# Patient Record
Sex: MALE | Race: WHITE | NOT HISPANIC OR LATINO | Employment: FULL TIME | URBAN - METROPOLITAN AREA
[De-identification: names, ages, dates, MRNs, and addresses within clinical notes are randomized per-mention and may not be internally consistent; named-entity substitution may affect disease eponyms.]

---

## 2017-01-17 ENCOUNTER — PATIENT MESSAGE (OUTPATIENT)
Dept: HEPATOLOGY | Facility: CLINIC | Age: 37
End: 2017-01-17

## 2017-01-17 ENCOUNTER — TELEPHONE (OUTPATIENT)
Dept: HEPATOLOGY | Facility: CLINIC | Age: 37
End: 2017-01-17

## 2017-01-17 DIAGNOSIS — I81 PORTAL VEIN THROMBOSIS: Primary | ICD-10-CM

## 2017-01-17 NOTE — TELEPHONE ENCOUNTER
MA spoke with patient, appt schedule 01/23/2017 @ 4pm. Pt will arrive at 230 to get contrast.   NPO 4 hour before appt. Pt verbalized understanding. Appt letter mailed,.

## 2017-01-17 NOTE — TELEPHONE ENCOUNTER
----- Message from Karen Dixon sent at 1/17/2017  1:42 PM CST -----  Contact: self  Patient states need to speak with nurse regarding appointment for Ct Scan   Please call pt at [phone].

## 2017-01-23 ENCOUNTER — HOSPITAL ENCOUNTER (OUTPATIENT)
Dept: RADIOLOGY | Facility: HOSPITAL | Age: 37
Discharge: HOME | End: 2017-01-23
Attending: INTERNAL MEDICINE
Payer: COMMERCIAL

## 2017-01-23 ENCOUNTER — OFFICE VISIT (OUTPATIENT)
Dept: HEPATOLOGY | Facility: CLINIC | Age: 37
End: 2017-01-23
Payer: COMMERCIAL

## 2017-01-23 VITALS
SYSTOLIC BLOOD PRESSURE: 126 MMHG | DIASTOLIC BLOOD PRESSURE: 80 MMHG | WEIGHT: 226 LBS | RESPIRATION RATE: 18 BRPM | TEMPERATURE: 97 F | BODY MASS INDEX: 36.32 KG/M2 | HEART RATE: 52 BPM | HEIGHT: 66 IN | OXYGEN SATURATION: 98 %

## 2017-01-23 DIAGNOSIS — K76.6 PORTAL HYPERTENSION WITH ESOPHAGEAL VARICES: Primary | ICD-10-CM

## 2017-01-23 DIAGNOSIS — I26.01 ACUTE SEPTIC PULMONARY EMBOLISM WITH ACUTE COR PULMONALE: ICD-10-CM

## 2017-01-23 DIAGNOSIS — I81 PORTAL VEIN THROMBOSIS: ICD-10-CM

## 2017-01-23 DIAGNOSIS — I85.00 PORTAL HYPERTENSION WITH ESOPHAGEAL VARICES: Primary | ICD-10-CM

## 2017-01-23 PROCEDURE — 25500020 PHARM REV CODE 255: Performed by: INTERNAL MEDICINE

## 2017-01-23 PROCEDURE — 99214 OFFICE O/P EST MOD 30 MIN: CPT | Mod: S$GLB,,, | Performed by: INTERNAL MEDICINE

## 2017-01-23 PROCEDURE — 99999 PR PBB SHADOW E&M-EST. PATIENT-LVL III: CPT | Mod: PBBFAC,,, | Performed by: INTERNAL MEDICINE

## 2017-01-23 PROCEDURE — 74160 CT ABDOMEN W/CONTRAST: CPT | Mod: 26,,, | Performed by: RADIOLOGY

## 2017-01-23 PROCEDURE — 1159F MED LIST DOCD IN RCRD: CPT | Mod: S$GLB,,, | Performed by: INTERNAL MEDICINE

## 2017-01-23 PROCEDURE — 74160 CT ABDOMEN W/CONTRAST: CPT | Mod: TC

## 2017-01-23 RX ORDER — PANTOPRAZOLE SODIUM 40 MG/1
40 TABLET, DELAYED RELEASE ORAL DAILY
COMMUNITY
Start: 2017-01-03

## 2017-01-23 RX ORDER — VORTIOXETINE 10 MG/1
10 TABLET, FILM COATED ORAL DAILY
COMMUNITY
Start: 2017-01-10 | End: 2018-05-28

## 2017-01-23 RX ADMIN — IOHEXOL 15 ML: 350 INJECTION, SOLUTION INTRAVENOUS at 02:01

## 2017-01-23 RX ADMIN — IOHEXOL 100 ML: 350 INJECTION, SOLUTION INTRAVENOUS at 03:01

## 2017-01-23 NOTE — PROGRESS NOTES
Subjective:       Patient ID: Parker Peterson is a 36 y.o. male.    Chief Complaint: Follow-up    HPI  I saw this 36 y.o. in the hepatology clinic for follow up.    Mesenteric Vein thrombosis in 2007- anticoagulated with warfarin for about 18 months    Admitted with abdominal pain in Sep 2014- portal vein/splenic vein clot  Varices/mild ascites  Banded    Variceal bleed in [place] in Oct 2014- admitted to [facility]/[place]  - esophageal and gastric varices  - banded for the second time in 1 month    Now feels well    Seen by Dr Wyatt Dumont- not found to have a hematologic condition- long term anticoagulation recommended.    Dr Rodriguez - EGD and banded - May 2016 (esophageal + gastric)  CT scan Jan 2016- open veins      Liver biopsy in Nov 2014- essentially normal    Abdominal US: 10/9/15  #1. Subtle nodular contour of the liver. This may reflects cirrhosis.  #2. Splenomegaly.    FH:  No liver disease or clotting disorders    SH:  [facility]    9 year old daughter- healhy  Alcohol- never heavy  Non smoker  No drugs    No supplements    Review of Systems   Constitutional: Negative for activity change, appetite change, chills, fatigue, fever and unexpected weight change.   HENT: Negative for hearing loss.    Eyes: Negative for discharge and visual disturbance.   Respiratory: Negative for cough, chest tightness, shortness of breath and wheezing.    Cardiovascular: Negative for chest pain, palpitations and leg swelling.   Gastrointestinal: Negative for abdominal distention, abdominal pain, constipation, diarrhea and nausea.   Genitourinary: Negative for dysuria and frequency.   Musculoskeletal: Negative for arthralgias and back pain.   Skin: Negative for pallor and rash.   Neurological: Negative for dizziness, tremors, speech difficulty and headaches.   Hematological: Negative for adenopathy.   Psychiatric/Behavioral: Negative for agitation and confusion.           Lab Results   Component Value  Date    ALT 58 (H) 05/30/2016     Past Medical History   Diagnosis Date    Anemia     Esophageal varices     Mesenteric vein thrombosis     Portal hypertension with esophageal varices     Pulmonary embolism      Past Surgical History   Procedure Laterality Date    Cholecystectomy       Laparoscopic    Tonsillectomy      Esophagogastroduodenoscopy w/ banding       Current Outpatient Prescriptions   Medication Sig    pantoprazole (PROTONIX) 40 MG tablet Take 40 mg by mouth Daily.    propranolol (INDERAL) 10 MG tablet Take 10 mg by mouth 3 (three) times daily.    TRINTELLIX 10 mg Tab Take 10 mg by mouth Daily.    warfarin (COUMADIN) 5 MG tablet Take 1 tablet (5 mg total) by mouth Daily. (Patient taking differently: Take 5 mg by mouth Daily. )     No current facility-administered medications for this visit.        Objective:      Physical Exam   Constitutional: He is oriented to person, place, and time. He appears well-nourished.   HENT:   Head: Normocephalic.   Eyes: Pupils are equal, round, and reactive to light.   Neck: No thyromegaly present.   Cardiovascular: Normal rate, regular rhythm and normal heart sounds.    Pulmonary/Chest: Effort normal and breath sounds normal. He has no wheezes.   Abdominal: Soft. He exhibits no distension and no mass. There is no tenderness.   Lymphadenopathy:     He has no cervical adenopathy.   Neurological: He is alert and oriented to person, place, and time.   Skin: Skin is warm. No rash noted. No erythema.   Psychiatric: He has a normal mood and affect. His behavior is normal.         Assessment:       1. Portal hypertension with esophageal varices    2. Acute septic pulmonary embolism with acute cor pulmonale        Plan:   Continue anticoagulation (warfarin) which will likely be lifelong.    - CT abdo today- to assess patency of portal veins  - EGD locally to consider further banding    - CT today  - EGD  - continue with warfarin  1 year. Video visit    ? Endocrinology  referral for estrogen level as a cause of hypercoagulopathy?  Will discuss with PCP.

## 2017-04-18 ENCOUNTER — PATIENT MESSAGE (OUTPATIENT)
Dept: GASTROENTEROLOGY | Facility: CLINIC | Age: 37
End: 2017-04-18

## 2017-04-18 DIAGNOSIS — I85.10 ESOPHAGEAL VARICES IN CIRRHOSIS: Primary | ICD-10-CM

## 2017-04-18 DIAGNOSIS — K74.60 ESOPHAGEAL VARICES IN CIRRHOSIS: Primary | ICD-10-CM

## 2017-04-20 ENCOUNTER — TELEPHONE (OUTPATIENT)
Dept: ENDOSCOPY | Facility: HOSPITAL | Age: 37
End: 2017-04-20

## 2017-04-20 NOTE — TELEPHONE ENCOUNTER
Fax sent to Dr. Walker at [facility], p: [phone] [phone], requesting permission to hold Coumadin x 5 days prior to procedure. Awaiting fax back.

## 2017-04-27 ENCOUNTER — TELEPHONE (OUTPATIENT)
Dept: ENDOSCOPY | Facility: HOSPITAL | Age: 37
End: 2017-04-27

## 2017-04-27 DIAGNOSIS — I85.00 ESOPHAGEAL VARICES WITHOUT BLEEDING, UNSPECIFIED ESOPHAGEAL VARICES TYPE: Primary | ICD-10-CM

## 2017-04-27 NOTE — TELEPHONE ENCOUNTER
Patient is scheduled for EGD 5/26/2017 with Dr. Huntley.  Instructions sent via e-mail. Patient may hold Coumadin x 5 days prior to procedure, as per Dr. Walker.  Labs prior.

## 2017-05-26 ENCOUNTER — SURGERY (OUTPATIENT)
Age: 37
End: 2017-05-26

## 2017-05-26 ENCOUNTER — ANESTHESIA EVENT (OUTPATIENT)
Dept: ENDOSCOPY | Facility: HOSPITAL | Age: 37
End: 2017-05-26
Payer: COMMERCIAL

## 2017-05-26 ENCOUNTER — ANESTHESIA (OUTPATIENT)
Dept: ENDOSCOPY | Facility: HOSPITAL | Age: 37
End: 2017-05-26
Payer: COMMERCIAL

## 2017-05-26 ENCOUNTER — HOSPITAL ENCOUNTER (OUTPATIENT)
Facility: HOSPITAL | Age: 37
Discharge: HOME | End: 2017-05-26
Attending: INTERNAL MEDICINE | Admitting: INTERNAL MEDICINE
Payer: COMMERCIAL

## 2017-05-26 VITALS
SYSTOLIC BLOOD PRESSURE: 147 MMHG | RESPIRATION RATE: 18 BRPM | HEART RATE: 63 BPM | DIASTOLIC BLOOD PRESSURE: 87 MMHG | TEMPERATURE: 98 F | WEIGHT: 220 LBS | OXYGEN SATURATION: 98 % | HEIGHT: 66 IN | BODY MASS INDEX: 35.36 KG/M2

## 2017-05-26 DIAGNOSIS — I26.01 ACUTE SEPTIC PULMONARY EMBOLISM WITH ACUTE COR PULMONALE: Primary | ICD-10-CM

## 2017-05-26 DIAGNOSIS — I85.00 PORTAL HYPERTENSION WITH ESOPHAGEAL VARICES: ICD-10-CM

## 2017-05-26 DIAGNOSIS — K76.6 PORTAL HYPERTENSION: ICD-10-CM

## 2017-05-26 DIAGNOSIS — K76.6 PORTAL HYPERTENSION WITH ESOPHAGEAL VARICES: ICD-10-CM

## 2017-05-26 PROCEDURE — 25000003 PHARM REV CODE 250: Performed by: INTERNAL MEDICINE

## 2017-05-26 PROCEDURE — 37000008 HC ANESTHESIA 1ST 15 MINUTES: Performed by: INTERNAL MEDICINE

## 2017-05-26 PROCEDURE — 25000003 PHARM REV CODE 250: Performed by: NURSE ANESTHETIST, CERTIFIED REGISTERED

## 2017-05-26 PROCEDURE — 43235 EGD DIAGNOSTIC BRUSH WASH: CPT | Performed by: INTERNAL MEDICINE

## 2017-05-26 PROCEDURE — 63600175 PHARM REV CODE 636 W HCPCS: Performed by: NURSE ANESTHETIST, CERTIFIED REGISTERED

## 2017-05-26 PROCEDURE — 43235 EGD DIAGNOSTIC BRUSH WASH: CPT | Mod: ,,, | Performed by: INTERNAL MEDICINE

## 2017-05-26 PROCEDURE — 37000009 HC ANESTHESIA EA ADD 15 MINS: Performed by: INTERNAL MEDICINE

## 2017-05-26 PROCEDURE — D9220A PRA ANESTHESIA: Mod: CRNA,,, | Performed by: NURSE ANESTHETIST, CERTIFIED REGISTERED

## 2017-05-26 PROCEDURE — D9220A PRA ANESTHESIA: Mod: ANES,,, | Performed by: ANESTHESIOLOGY

## 2017-05-26 RX ORDER — SODIUM CHLORIDE 9 MG/ML
INJECTION, SOLUTION INTRAVENOUS CONTINUOUS
Status: DISCONTINUED | OUTPATIENT
Start: 2017-05-26 | End: 2017-05-26 | Stop reason: HOSPADM

## 2017-05-26 RX ORDER — LIDOCAINE HCL/PF 100 MG/5ML
SYRINGE (ML) INTRAVENOUS
Status: DISCONTINUED | OUTPATIENT
Start: 2017-05-26 | End: 2017-05-26

## 2017-05-26 RX ORDER — PROPOFOL 10 MG/ML
VIAL (ML) INTRAVENOUS
Status: DISCONTINUED | OUTPATIENT
Start: 2017-05-26 | End: 2017-05-26

## 2017-05-26 RX ADMIN — PROPOFOL 50 MG: 10 INJECTION, EMULSION INTRAVENOUS at 01:05

## 2017-05-26 RX ADMIN — LIDOCAINE HYDROCHLORIDE 20 MG: 20 INJECTION, SOLUTION INTRAVENOUS at 01:05

## 2017-05-26 RX ADMIN — PROPOFOL 100 MG: 10 INJECTION, EMULSION INTRAVENOUS at 01:05

## 2017-05-26 RX ADMIN — SODIUM CHLORIDE: 0.9 INJECTION, SOLUTION INTRAVENOUS at 12:05

## 2017-05-26 NOTE — DISCHARGE INSTRUCTIONS

## 2017-05-26 NOTE — ANESTHESIA POSTPROCEDURE EVALUATION
"Anesthesia Post Evaluation    Patient: Parker Peterson    Procedure(s) Performed: Procedure(s) (LRB):  ESOPHAGOGASTRODUODENOSCOPY (EGD) (N/A)    Final Anesthesia Type: general  Patient location during evaluation: GI PACU  Patient participation: Yes- Able to Participate  Level of consciousness: awake and alert  Post-procedure vital signs: reviewed and stable  Pain management: adequate  Airway patency: patent  PONV status at discharge: No PONV  Anesthetic complications: no      Cardiovascular status: blood pressure returned to baseline  Respiratory status: unassisted  Hydration status: euvolemic  Follow-up not needed.        Visit Vitals  BP (!) 147/87 (BP Location: Left arm, Patient Position: Sitting, BP Method: Automatic)   Pulse 63   Temp 36.7 °C (98.1 °F) (Oral)   Resp 18   Ht 5' 6" (1.676 m)   Wt 99.8 kg (220 lb)   SpO2 98%   BMI 35.51 kg/m²       Pain/Kori Score: Pain Assessment Performed: Yes (5/26/2017  1:44 PM)  Presence of Pain: denies (5/26/2017  1:44 PM)  Kori Score: 10 (5/26/2017  1:29 PM)      "

## 2017-05-26 NOTE — TRANSFER OF CARE
"Anesthesia Transfer of Care Note    Patient: Parker Peterson    Procedure(s) Performed: Procedure(s) (LRB):  ESOPHAGOGASTRODUODENOSCOPY (EGD) (N/A)    Patient location: PACU    Anesthesia Type: general    Transport from OR: Transported from OR on 6-10 L/min O2 by face mask with adequate spontaneous ventilation    Post pain: adequate analgesia    Post assessment: no apparent anesthetic complications    Post vital signs: stable    Level of consciousness: awake    Nausea/Vomiting: no nausea/vomiting    Complications: none    Transfer of care protocol was followed      Last vitals:   Visit Vitals  /64 (BP Location: Right arm, Patient Position: Lying, BP Method: Automatic)   Pulse 81   Temp 36.7 °C (98.1 °F) (Oral)   Resp 16   Ht 5' 6" (1.676 m)   Wt 99.8 kg (220 lb)   SpO2 99%   BMI 35.51 kg/m²     "

## 2017-05-26 NOTE — H&P
Short Stay Endoscopy History and Physical    PCP - Primary Doctor No     Procedure - EGD  ASA - per anesthesia  Mallampati - per anesthesia  History of Anesthesia problems - no  Family history Anesthesia problems -  no   Plan of anesthesia - General    HPI:  This is a 36 y.o. male here for evaluation of :     Esophageal varices    ROS:  Constitutional: No fevers, chills, No weight loss  CV: No chest pain  Pulm: No cough, No shortness of breath  Ophtho: No vision changes  GI: see HPI  Derm: No rash    Medical History:  has a past medical history of Anemia; Esophageal varices; Mesenteric vein thrombosis; Portal hypertension with esophageal varices; and Pulmonary embolism.    Surgical History:  has a past surgical history that includes Cholecystectomy; Tonsillectomy; and Esophagogastroduodenoscopy w/ banding.    Family History: family history is not on file.. Otherwise no colon cancer, inflammatory bowel disease, or GI malignancies.    Social History:  reports that he has never smoked. He does not have any smokeless tobacco history on file. He reports that he drinks alcohol. He reports that he does not use drugs.    Review of patient's allergies indicates:  No Known Allergies    Medications:   Prescriptions Prior to Admission   Medication Sig Dispense Refill Last Dose    pantoprazole (PROTONIX) 40 MG tablet Take 40 mg by mouth Daily.   5/25/2017 at 2030    propranolol (INDERAL) 10 MG tablet Take 10 mg by mouth 3 (three) times daily.   5/25/2017 at 2030    TRINTELLIX 10 mg Tab Take 10 mg by mouth Daily.   5/26/2017 at 0615    warfarin (COUMADIN) 5 MG tablet Take 1 tablet (5 mg total) by mouth Daily. (Patient taking differently: Take 5 mg by mouth Daily. ) 30 tablet 3 5/22/2017 at 2130       Physical Exam:    Vital Signs:   Vitals:    05/26/17 1213   BP: 124/82   Pulse: 64   Resp: 20   Temp: 98.8 °F (37.1 °C)       General Appearance: Well appearing in no acute distress  Eyes:    No scleral icterus  ENT: Neck  supple, Lips, mucosa, and tongue normal; teeth and gums normal  Lungs: CTA anteriorly  Heart:  Regular rate, S1, S2 normal, no murmurs heard.  Abdomen: Soft, non tender, non distended with normal bowel sounds. No hepatosplenomegaly, ascites, or mass.  Extremities: No edema  Skin: No rash    Labs:  Lab Results   Component Value Date    WBC 5.47 05/26/2017    HGB 15.2 05/26/2017    HCT 44.4 05/26/2017     (L) 05/26/2017    ALT 58 (H) 05/30/2016    AST 19 05/30/2016     05/30/2016    K 4.4 05/30/2016     05/30/2016    CREATININE 0.9 05/30/2016    BUN 16 05/30/2016    CO2 25 05/30/2016    INR 1.2 05/26/2017       I have explained the risks and benefits of endoscopy procedures to the patient including but not limited to bleeding, perforation, infection, and death.      Cortez Huntley MD

## 2017-05-26 NOTE — PATIENT INSTRUCTIONS
Discharge Summary/Instructions for after EGD without Biopsy  Patient Name: Parker Peterson  Patient MRN: [id number]  Patient YOB: 1980  Friday, May 26, 2017  Cortez Huntley MD  1.  Do Not eat or drink anything for 1 hour.  Try sips of water first.  If   tolerated, resume your regular diet or one recommended by your physician.  2.  Do not drive, operate machinery, make critical decisions, or do   activities that require coordination or balance for 24 hours.  3.  You may experience a sore throat for 24 to 48 hours.  You may use throat   lozenges or gargle with warm salt water to relieve the discomfort.  4.  Because air was put into your stomach during the procedure, you may   experience some belching.  5.  Go directly to the emergency room if you notice any of the following:   Chills and/or fever over 101   Persistent vomiting or vomiting with blood   Severe abdominal pain, other than gas cramps   Severe chest pain   Black, tarry stools  Your doctor recommends these additional instructions:  If any biopsies were performed, my office will call you in 5 to 6 business   days with any results.  You have a contact number available for emergencies.  The signs and symptoms   of potential delayed complications were discussed with you.  You may return   to normal activities tomorrow.  Written discharge instructions were   provided to you.   You are being discharged to home.   Your physician has recommended a repeat upper endoscopy in one year for   surveillance.   The findings and recommendations were discussed with your designated   responsible adult.  If you have any questions or problems, please call your physician.  EMERGENCY PHONE NUMBER: [phone]  LAB RESULTS: [phone]  Cortez Huntley MD  5/26/2017 1:10:08 PM  This report has been verified and signed electronically.

## 2017-05-26 NOTE — ANESTHESIA PREPROCEDURE EVALUATION
05/26/2017  Parker Peterson is a 36 y.o., male.    Anesthesia Evaluation    I have reviewed the Patient Summary Reports.    I have reviewed the Nursing Notes.   I have reviewed the Medications.     Review of Systems  Anesthesia Hx:  No problems with previous Anesthesia  History of prior surgery of interest to airway management or planning: Previous anesthesia: General Denies Family Hx of Anesthesia complications.   Denies Personal Hx of Anesthesia complications.   Social:  Non-Smoker    Hematology/Oncology:     Oncology Normal    -- Anemia:   EENT/Dental:EENT/Dental Normal   Cardiovascular:  Cardiovascular Normal Exercise tolerance: good  Hx mesenteric vein thrombosis; PE; portal HTN   Pulmonary:  Pulmonary Normal    Renal/:  Renal/ Normal     Hepatic/GI:   GERD Esophageal varices   Musculoskeletal:  Musculoskeletal Normal    Neurological:  Neurology Normal    Endocrine:  Endocrine Normal    Dermatological:  Skin Normal    Psych:  Psychiatric Normal           Physical Exam  General:  Well nourished, Obesity    Airway/Jaw/Neck:  Airway Findings: Mouth Opening: Small, but > 3cm Tongue: Normal  General Airway Assessment: Adult, Average  Mallampati: III  Improves to II with phonation.  TM Distance: 4 - 6 cm        Eyes/Ears/Nose:  EYES/EARS/NOSE FINDINGS: Normal   Dental:  Dental Findings: In tact   Chest/Lungs:  Chest/Lungs Findings: Clear to auscultation, Normal Respiratory Rate     Heart/Vascular:  Heart Findings: Rate: Normal  Rhythm: Regular Rhythm  Sounds: Normal  Heart murmur: negative Vascular Findings: Normal    Abdomen:  Abdomen Findings: Normal    Musculoskeletal:  Musculoskeletal Findings: Normal   Skin:  Skin Findings: Normal    Mental Status:  Mental Status Findings:  Cooperative, Alert and Oriented         Anesthesia Plan  Type of Anesthesia, risks & benefits discussed:  Anesthesia Type:   general  Patient's Preference:   Intra-op Monitoring Plan:   Intra-op Monitoring Plan Comments:   Post Op Pain Control Plan:   Post Op Pain Control Plan Comments:   Induction:   IV  Beta Blocker:  Patient is on a Beta-Blocker and has received one dose within the past 24 hours (No further documentation required).       Informed Consent: Patient understands risks and agrees with Anesthesia plan.  Questions answered. Anesthesia consent signed with patient.  ASA Score: 3     Day of Surgery Review of History & Physical:  There are no significant changes.          Ready For Surgery From Anesthesia Perspective.

## 2017-06-02 ENCOUNTER — TELEPHONE (OUTPATIENT)
Dept: ENDOSCOPY | Facility: HOSPITAL | Age: 37
End: 2017-06-02

## 2018-03-24 ENCOUNTER — PATIENT MESSAGE (OUTPATIENT)
Dept: HEPATOLOGY | Facility: CLINIC | Age: 38
End: 2018-03-24

## 2018-03-26 DIAGNOSIS — I85.00 PORTAL HYPERTENSION WITH ESOPHAGEAL VARICES: ICD-10-CM

## 2018-03-26 DIAGNOSIS — K76.6 PORTAL HYPERTENSION WITH ESOPHAGEAL VARICES: ICD-10-CM

## 2018-03-26 DIAGNOSIS — K74.60 CIRRHOSIS OF LIVER WITHOUT ASCITES, UNSPECIFIED HEPATIC CIRRHOSIS TYPE: Primary | ICD-10-CM

## 2018-04-12 ENCOUNTER — PATIENT MESSAGE (OUTPATIENT)
Dept: HEPATOLOGY | Facility: CLINIC | Age: 38
End: 2018-04-12

## 2018-05-28 ENCOUNTER — OFFICE VISIT (OUTPATIENT)
Dept: HEPATOLOGY | Facility: CLINIC | Age: 38
End: 2018-05-28
Payer: COMMERCIAL

## 2018-05-28 ENCOUNTER — HOSPITAL ENCOUNTER (OUTPATIENT)
Dept: RADIOLOGY | Facility: HOSPITAL | Age: 38
Discharge: HOME | End: 2018-05-28
Attending: INTERNAL MEDICINE
Payer: COMMERCIAL

## 2018-05-28 VITALS
OXYGEN SATURATION: 98 % | SYSTOLIC BLOOD PRESSURE: 130 MMHG | DIASTOLIC BLOOD PRESSURE: 86 MMHG | WEIGHT: 226.19 LBS | RESPIRATION RATE: 18 BRPM | BODY MASS INDEX: 36.35 KG/M2 | HEART RATE: 67 BPM | TEMPERATURE: 96 F | HEIGHT: 66 IN

## 2018-05-28 DIAGNOSIS — K76.6 PORTAL HYPERTENSION WITH ESOPHAGEAL VARICES: ICD-10-CM

## 2018-05-28 DIAGNOSIS — D68.59 HYPERCOAGULABLE STATE, PRIMARY: ICD-10-CM

## 2018-05-28 DIAGNOSIS — I26.01 ACUTE SEPTIC PULMONARY EMBOLISM WITH ACUTE COR PULMONALE: ICD-10-CM

## 2018-05-28 DIAGNOSIS — K76.6 PORTAL HYPERTENSION: Primary | ICD-10-CM

## 2018-05-28 DIAGNOSIS — I85.00 PORTAL HYPERTENSION WITH ESOPHAGEAL VARICES: ICD-10-CM

## 2018-05-28 PROCEDURE — 3008F BODY MASS INDEX DOCD: CPT | Mod: CPTII,S$GLB,, | Performed by: INTERNAL MEDICINE

## 2018-05-28 PROCEDURE — 74160 CT ABDOMEN W/CONTRAST: CPT | Mod: 26,,, | Performed by: RADIOLOGY

## 2018-05-28 PROCEDURE — 99215 OFFICE O/P EST HI 40 MIN: CPT | Mod: S$GLB,,, | Performed by: INTERNAL MEDICINE

## 2018-05-28 PROCEDURE — 74160 CT ABDOMEN W/CONTRAST: CPT | Mod: TC

## 2018-05-28 PROCEDURE — 99999 PR PBB SHADOW E&M-EST. PATIENT-LVL III: CPT | Mod: PBBFAC,,, | Performed by: INTERNAL MEDICINE

## 2018-05-28 PROCEDURE — 25500020 PHARM REV CODE 255: Performed by: INTERNAL MEDICINE

## 2018-05-28 RX ORDER — VENLAFAXINE HYDROCHLORIDE 75 MG/1
75 CAPSULE, EXTENDED RELEASE ORAL DAILY
Status: ON HOLD | COMMUNITY
End: 2022-06-24 | Stop reason: HOSPADM

## 2018-05-28 RX ADMIN — IOHEXOL 100 ML: 350 INJECTION, SOLUTION INTRAVENOUS at 09:05

## 2018-05-28 NOTE — PROGRESS NOTES
Subjective:       Patient ID: Parker Peterson is a 37 y.o. male.    Chief Complaint: Abnormal Abdominal/Liver Imaging    HPI  I saw this 37 y.o. in the hepatology clinic for follow up. He came with his wife.    Mesenteric Vein thrombosis in 2007- anticoagulated with warfarin for about 18 months    Admitted with abdominal pain in Sep 2014- portal vein/splenic vein clot  Varices/mild ascites  Banded    Variceal bleed in [place] in Oct 2014- admitted to [facility]/[place]  - esophageal and gastric varices  - banded for the second time in 1 month    Now feels well  Has gained 30 lb since 2014    Seen by Dr Wyatt Dumont- not found to have a hematologic condition- long term anticoagulation recommended.    Dr Rodriguez - EGD and banded - May 2016 (esophageal + gastric)  CT scan Jan 2016- open veins    Last EGD: May 2017  - Normal esophagus. Scarred from prior banding.                        - 3 cm hiatal hernia.                        - Normal stomach.                        - Normal examined duodenum    Liver biopsy in Nov 2014- essentially normal  LFTs normal today  Still on warfarin.    CT abdo: 5/28/2018  Gastric varices, mesenteric and splenic collateral suggestive of portal hypertension.  Small caliber splenic vein.  Atrophic right hepatic lobe, unchanged.  Additional findings include a small hiatal hernia and cholecystectomy.    FH:  No liver disease or clotting disorders    SH:  [facility]    9 year old daughter- healhy  Alcohol- never heavy  Non smoker  No drugs    No supplements    Review of Systems   Constitutional: Negative for activity change, appetite change, chills, fatigue, fever and unexpected weight change.   HENT: Negative for hearing loss.    Eyes: Negative for discharge and visual disturbance.   Respiratory: Negative for cough, chest tightness, shortness of breath and wheezing.    Cardiovascular: Negative for chest pain, palpitations and leg swelling.   Gastrointestinal: Negative for  abdominal distention, abdominal pain, constipation, diarrhea and nausea.   Genitourinary: Negative for dysuria and frequency.   Musculoskeletal: Negative for arthralgias and back pain.   Skin: Negative for pallor and rash.   Neurological: Negative for dizziness, tremors, speech difficulty and headaches.   Hematological: Negative for adenopathy.   Psychiatric/Behavioral: Negative for agitation and confusion.           Lab Results   Component Value Date    ALT 24 05/28/2018     Past Medical History:   Diagnosis Date    Anemia     Esophageal varices     Mesenteric vein thrombosis     Portal hypertension with esophageal varices     Pulmonary embolism      Past Surgical History:   Procedure Laterality Date    CHOLECYSTECTOMY      Laparoscopic    ESOPHAGOGASTRODUODENOSCOPY W/ BANDING      TONSILLECTOMY       Current Outpatient Prescriptions   Medication Sig    pantoprazole (PROTONIX) 40 MG tablet Take 40 mg by mouth Daily.    propranolol (INDERAL) 10 MG tablet Take 10 mg by mouth 3 (three) times daily.    venlafaxine (EFFEXOR-XR) 75 MG 24 hr capsule Take 75 mg by mouth once daily.    warfarin (COUMADIN) 5 MG tablet Take 1 tablet (5 mg total) by mouth Daily. (Patient taking differently: Take 5 mg by mouth Daily. )     No current facility-administered medications for this visit.        Objective:      Physical Exam   Constitutional: He is oriented to person, place, and time. He appears well-nourished.   HENT:   Head: Normocephalic.   Eyes: Pupils are equal, round, and reactive to light.   Neck: No thyromegaly present.   Cardiovascular: Normal rate, regular rhythm and normal heart sounds.    Pulmonary/Chest: Effort normal and breath sounds normal. He has no wheezes.   Abdominal: Soft. He exhibits no distension and no mass. There is no tenderness.   Lymphadenopathy:     He has no cervical adenopathy.   Neurological: He is alert and oriented to person, place, and time.   Skin: Skin is warm. No rash noted. No  erythema.   Psychiatric: He has a normal mood and affect. His behavior is normal.         Assessment:       1. Portal hypertension    2. Portal hypertension with esophageal varices    3. Acute septic pulmonary embolism with acute cor pulmonale    4. Hypercoagulable state, primary        Plan:   Continue anticoagulation (warfarin) which will likely be lifelong.    - CT abdo - will ask Dr Silva whether an intervention to open up his portal vein would be a possibility.    - will see local hematologist to discuss new anticoagulation drugs.    Clinic in 1 year.

## 2018-05-29 ENCOUNTER — PATIENT MESSAGE (OUTPATIENT)
Dept: HEPATOLOGY | Facility: CLINIC | Age: 38
End: 2018-05-29

## 2018-09-06 ENCOUNTER — TELEPHONE (OUTPATIENT)
Dept: ENDOSCOPY | Facility: HOSPITAL | Age: 38
End: 2018-09-06

## 2018-09-06 ENCOUNTER — PATIENT MESSAGE (OUTPATIENT)
Dept: GASTROENTEROLOGY | Facility: CLINIC | Age: 38
End: 2018-09-06

## 2018-09-06 DIAGNOSIS — I85.10 ESOPHAGEAL VARICES IN CIRRHOSIS: Primary | ICD-10-CM

## 2018-09-06 DIAGNOSIS — K74.60 ESOPHAGEAL VARICES IN CIRRHOSIS: Primary | ICD-10-CM

## 2018-09-06 NOTE — TELEPHONE ENCOUNTER
Dr. Huntley,  Per pt records - Repeat upper endoscopy in 1 year for surveillance  Please advise on orders once placed

## 2018-09-17 ENCOUNTER — TELEPHONE (OUTPATIENT)
Dept: ENDOSCOPY | Facility: HOSPITAL | Age: 38
End: 2018-09-17

## 2018-09-17 DIAGNOSIS — I85.00 ESOPHAGEAL VARICES WITHOUT BLEEDING, UNSPECIFIED ESOPHAGEAL VARICES TYPE: Primary | ICD-10-CM

## 2018-09-21 ENCOUNTER — PATIENT MESSAGE (OUTPATIENT)
Dept: ADMINISTRATIVE | Facility: OTHER | Age: 38
End: 2018-09-21

## 2018-10-12 ENCOUNTER — ANESTHESIA (OUTPATIENT)
Dept: ENDOSCOPY | Facility: HOSPITAL | Age: 38
End: 2018-10-12
Payer: COMMERCIAL

## 2018-10-12 ENCOUNTER — HOSPITAL ENCOUNTER (OUTPATIENT)
Facility: HOSPITAL | Age: 38
Discharge: HOME | End: 2018-10-12
Attending: INTERNAL MEDICINE | Admitting: INTERNAL MEDICINE
Payer: COMMERCIAL

## 2018-10-12 ENCOUNTER — ANESTHESIA EVENT (OUTPATIENT)
Dept: ENDOSCOPY | Facility: HOSPITAL | Age: 38
End: 2018-10-12
Payer: COMMERCIAL

## 2018-10-12 VITALS
HEIGHT: 69 IN | HEART RATE: 54 BPM | DIASTOLIC BLOOD PRESSURE: 98 MMHG | BODY MASS INDEX: 32.58 KG/M2 | TEMPERATURE: 97 F | SYSTOLIC BLOOD PRESSURE: 131 MMHG | RESPIRATION RATE: 18 BRPM | OXYGEN SATURATION: 97 % | WEIGHT: 220 LBS

## 2018-10-12 DIAGNOSIS — I85.00 ESOPHAGEAL VARICES: ICD-10-CM

## 2018-10-12 DIAGNOSIS — I85.00 PORTAL HYPERTENSION WITH ESOPHAGEAL VARICES: Primary | ICD-10-CM

## 2018-10-12 DIAGNOSIS — K76.6 PORTAL HYPERTENSION WITH ESOPHAGEAL VARICES: Primary | ICD-10-CM

## 2018-10-12 PROCEDURE — 43235 EGD DIAGNOSTIC BRUSH WASH: CPT | Performed by: INTERNAL MEDICINE

## 2018-10-12 PROCEDURE — 63600175 PHARM REV CODE 636 W HCPCS: Performed by: NURSE ANESTHETIST, CERTIFIED REGISTERED

## 2018-10-12 PROCEDURE — 37000009 HC ANESTHESIA EA ADD 15 MINS: Performed by: INTERNAL MEDICINE

## 2018-10-12 PROCEDURE — 43235 EGD DIAGNOSTIC BRUSH WASH: CPT | Mod: ,,, | Performed by: INTERNAL MEDICINE

## 2018-10-12 PROCEDURE — E9220 PRA ENDO ANESTHESIA: HCPCS | Mod: ,,, | Performed by: NURSE ANESTHETIST, CERTIFIED REGISTERED

## 2018-10-12 PROCEDURE — 25000003 PHARM REV CODE 250: Performed by: INTERNAL MEDICINE

## 2018-10-12 PROCEDURE — 37000008 HC ANESTHESIA 1ST 15 MINUTES: Performed by: INTERNAL MEDICINE

## 2018-10-12 RX ORDER — PROPOFOL 10 MG/ML
VIAL (ML) INTRAVENOUS
Status: DISCONTINUED | OUTPATIENT
Start: 2018-10-12 | End: 2018-10-12

## 2018-10-12 RX ORDER — SODIUM CHLORIDE 9 MG/ML
INJECTION, SOLUTION INTRAVENOUS CONTINUOUS
Status: DISCONTINUED | OUTPATIENT
Start: 2018-10-12 | End: 2018-10-12 | Stop reason: HOSPADM

## 2018-10-12 RX ORDER — FENTANYL CITRATE 50 UG/ML
INJECTION, SOLUTION INTRAMUSCULAR; INTRAVENOUS
Status: DISCONTINUED | OUTPATIENT
Start: 2018-10-12 | End: 2018-10-12

## 2018-10-12 RX ORDER — PROPOFOL 10 MG/ML
VIAL (ML) INTRAVENOUS CONTINUOUS PRN
Status: DISCONTINUED | OUTPATIENT
Start: 2018-10-12 | End: 2018-10-12

## 2018-10-12 RX ORDER — LIDOCAINE HCL/PF 100 MG/5ML
SYRINGE (ML) INTRAVENOUS
Status: DISCONTINUED | OUTPATIENT
Start: 2018-10-12 | End: 2018-10-12

## 2018-10-12 RX ORDER — SODIUM CHLORIDE 0.9 % (FLUSH) 0.9 %
3 SYRINGE (ML) INJECTION
Status: DISCONTINUED | OUTPATIENT
Start: 2018-10-12 | End: 2018-10-12 | Stop reason: HOSPADM

## 2018-10-12 RX ADMIN — PROPOFOL 150 MCG/KG/MIN: 10 INJECTION, EMULSION INTRAVENOUS at 03:10

## 2018-10-12 RX ADMIN — FENTANYL CITRATE 50 MCG: 50 INJECTION, SOLUTION INTRAMUSCULAR; INTRAVENOUS at 03:10

## 2018-10-12 RX ADMIN — PROPOFOL 50 MG: 10 INJECTION, EMULSION INTRAVENOUS at 03:10

## 2018-10-12 RX ADMIN — LIDOCAINE HYDROCHLORIDE 50 MG: 20 INJECTION, SOLUTION INTRAVENOUS at 03:10

## 2018-10-12 RX ADMIN — SODIUM CHLORIDE: 0.9 INJECTION, SOLUTION INTRAVENOUS at 01:10

## 2018-10-12 NOTE — ANESTHESIA PREPROCEDURE EVALUATION
10/12/2018  Parker Peterson is a 38 y.o., male.   Past Medical History:   Diagnosis Date    Anemia     Esophageal varices     Mesenteric vein thrombosis     Portal hypertension with esophageal varices     Pulmonary embolism      Past Surgical History:   Procedure Laterality Date    CHOLECYSTECTOMY      Laparoscopic    ESOPHAGOGASTRODUODENOSCOPY (EGD) N/A 5/26/2017    Performed by Cortez Huntley MD at [facility] (4TH FLR)    ESOPHAGOGASTRODUODENOSCOPY (EGD) N/A 11/10/2014    Performed by Cortez Huntley MD at [facility] (2ND FLR)    ESOPHAGOGASTRODUODENOSCOPY W/ BANDING      TONSILLECTOMY           Anesthesia Evaluation    I have reviewed the Patient Summary Reports.     I have reviewed the Medications.     Review of Systems  Anesthesia Hx:  Denies Hx of Anesthetic complications  Neg history of prior surgery. Denies Family Hx of Anesthesia complications.   Denies Personal Hx of Anesthesia complications.   Cardiovascular:   Exercise tolerance: good        Physical Exam  General:  Well nourished    Airway/Jaw/Neck:  Airway Findings: Mouth Opening: Normal Tongue: Normal  General Airway Assessment: Adult  Mallampati: II  TM Distance: Normal, at least 6 cm         Dental:  DENTAL FINDINGS: Normal   Chest/Lungs:  Chest/Lungs Clear    Heart/Vascular:  Heart Findings: Normal       Mental Status:  Mental Status Findings:  Alert and Oriented         Anesthesia Plan  Type of Anesthesia, risks & benefits discussed:  Anesthesia Type:  general  Patient's Preference:   Intra-op Monitoring Plan: standard ASA monitors  Intra-op Monitoring Plan Comments:   Post Op Pain Control Plan:   Post Op Pain Control Plan Comments:   Induction:   IV  Beta Blocker:  Patient is not currently on a Beta-Blocker (No further documentation required).       Informed Consent: Patient understands risks and agrees with Anesthesia plan.  Questions  answered. Anesthesia consent signed with patient.  ASA Score: 2     Day of Surgery Review of History & Physical:    H&P update referred to the provider.         Ready For Surgery From Anesthesia Perspective.

## 2018-10-12 NOTE — ANESTHESIA POSTPROCEDURE EVALUATION
"Anesthesia Post Evaluation    Patient: Parker Peterson    Procedure(s) Performed: Procedure(s) (LRB):  ESOPHAGOGASTRODUODENOSCOPY (EGD) (N/A)    Final Anesthesia Type: general  Patient location during evaluation: GI PACU  Patient participation: Yes- Able to Participate  Level of consciousness: awake and alert and oriented  Post-procedure vital signs: reviewed and stable  Pain management: adequate  Airway patency: patent  PONV status at discharge: No PONV  Anesthetic complications: no      Cardiovascular status: stable  Respiratory status: unassisted, spontaneous ventilation and room air  Hydration status: euvolemic  Follow-up not needed.        Visit Vitals  /88 (BP Location: Left arm, Patient Position: Lying)   Pulse 66   Temp 36.3 °C (97.3 °F) (Temporal)   Resp 16   Ht 5' 9" (1.753 m)   Wt 99.8 kg (220 lb)   SpO2 99%   BMI 32.49 kg/m²       Pain/Kori Score: Pain Assessment Performed: Yes (10/12/2018  3:19 PM)  Presence of Pain: non-verbal indicators absent (10/12/2018  3:19 PM)  Kori Score: 10 (10/12/2018  3:18 PM)        "

## 2018-10-12 NOTE — TRANSFER OF CARE
"Anesthesia Transfer of Care Note    Patient: Parker Peterson    Procedure(s) Performed: Procedure(s) (LRB):  ESOPHAGOGASTRODUODENOSCOPY (EGD) (N/A)    Patient location: GI    Anesthesia Type: general    Transport from OR: Transported from OR on 2-3 L/min O2 by NC with adequate spontaneous ventilation    Post pain: adequate analgesia    Post assessment: no apparent anesthetic complications and tolerated procedure well    Post vital signs: stable    Level of consciousness: awake, alert and oriented    Nausea/Vomiting: no nausea/vomiting    Complications: none    Transfer of care protocol was followed      Last vitals:   Visit Vitals  /88 (BP Location: Left arm, Patient Position: Lying)   Pulse 66   Temp 36.3 °C (97.3 °F) (Temporal)   Resp 16   Ht 5' 9" (1.753 m)   Wt 99.8 kg (220 lb)   SpO2 99%   BMI 32.49 kg/m²     "

## 2018-10-12 NOTE — PROVATION PATIENT INSTRUCTIONS
Discharge Summary/Instructions after an Endoscopic Procedure  Patient Name: Parker Peterson  Patient MRN: [id number]  Patient YOB: 1980 Friday, October 12, 2018  Cortez Huntley MD  RESTRICTIONS:  During your procedure today, you received medications for sedation.  These   medications may affect your judgment, balance and coordination.  Therefore,   for 24 hours, you have the following restrictions:   - DO NOT drive a car, operate machinery, make legal/financial decisions,   sign important papers or drink alcohol.    ACTIVITY:  Today: no heavy lifting, straining or running due to procedural   sedation/anesthesia.  The following day: return to full activity including work.  DIET:  Eat and drink normally unless instructed otherwise.     TREATMENT FOR COMMON SIDE EFFECTS:  - Mild abdominal pain, nausea, belching, bloating or excessive gas:  rest,   eat lightly and use a heating pad.  - Sore Throat: treat with throat lozenges and/or gargle with warm salt   water.  - Because air was used during the procedure, expelling large amounts of air   from your rectum or belching is normal.  - If a bowel prep was taken, you may not have a bowel movement for 1-3 days.    This is normal.  SYMPTOMS TO WATCH FOR AND REPORT TO YOUR PHYSICIAN:  1. Abdominal pain or bloating, other than gas cramps.  2. Chest pain.  3. Back pain.  4. Signs of infection such as: chills or fever occurring within 24 hours   after the procedure.  5. Rectal bleeding, which would show as bright red, maroon, or black stools.   (A tablespoon of blood from the rectum is not serious, especially if   hemorrhoids are present.)  6. Vomiting.  7. Weakness or dizziness.  GO DIRECTLY TO THE NEAREST EMERGENCY ROOM IF YOU HAVE ANY OF THE FOLLOWING:      Difficulty breathing              Chills and/or fever over 101 F   Persistent vomiting and/or vomiting blood   Severe abdominal pain   Severe chest pain   Black, tarry stools   Bleeding- more than one tablespoon   Any  other symptom or condition that you feel may need urgent attention  Your doctor recommends these additional instructions:  If any biopsies were taken, your doctors clinic will contact you in 1 to 2   weeks with any results.  - Patient has a contact number available for emergencies.  The signs and   symptoms of potential delayed complications were discussed with the   patient.  Return to normal activities tomorrow.  Written discharge   instructions were provided to the patient.   - Discharge patient to home.   - May resume coumadin and lovenox today per coumadin clinic   recommendations.  - Repeat upper endoscopy in 2 years for surveillance.   - The findings and recommendations were discussed with the designated   responsible adult.  For questions, problems or results please call your physician - Cortez Huntley MD at Work:  [phone].  [facility], EMERGENCY ROOM PHONE NUMBER: [phone]  IF A COMPLICATION OR EMERGENCY SITUATION ARISES AND YOU ARE UNABLE TO REACH   YOUR PHYSICIAN - GO DIRECTLY TO THE EMERGENCY ROOM.  Cortez Huntley MD  10/12/2018 3:10:34 PM  This report has been verified and signed electronically.  PROVATION

## 2018-10-12 NOTE — ANESTHESIA RELEASE NOTE
"Anesthesia Release from PACU Note    Patient: Parker Peterson    Procedure(s) Performed: Procedure(s) (LRB):  ESOPHAGOGASTRODUODENOSCOPY (EGD) (N/A)    Anesthesia type: GEN    Post pain: Adequate analgesia reported    Post assessment: no apparent anesthetic complications, tolerated procedure well and no evidence of recall    Post vital signs: /88 (BP Location: Left arm, Patient Position: Lying)   Pulse 66   Temp 36.3 °C (97.3 °F) (Temporal)   Resp 16   Ht 5' 9" (1.753 m)   Wt 99.8 kg (220 lb)   SpO2 99%   BMI 32.49 kg/m²     Level of consciousness: awake, alert and oriented    Nausea/Vomiting: no nausea/no vomiting    Complications: none    Airway Patency: patent    Respiratory: unassisted, spontaneous ventilation, room air    Cardiovascular: stable and blood pressure at baseline    Hydration: euvolemic    "

## 2018-10-12 NOTE — H&P
Short Stay Endoscopy History and Physical    PCP - Primary Doctor No     Procedure - EGD  ASA - per anesthesia  Mallampati - per anesthesia  History of Anesthesia problems - no  Family history Anesthesia problems -  no   Plan of anesthesia - General    HPI:  38 year old male with portal hypertension and mesenteric/portal/splenic vein clot on AC here for an EGD for variceal surveillance.     Labs today:  Platelets 163  INR 1.2    Last EGD in 2017 did not show any varices.    ROS:  Constitutional: No fevers, chills, No weight loss  CV: No chest pain  Pulm: No cough, No shortness of breath  Ophtho: No vision changes  GI: see HPI  Derm: No rash    Medical History:  has a past medical history of Anemia, Esophageal varices, Mesenteric vein thrombosis, Portal hypertension with esophageal varices, and Pulmonary embolism.    Surgical History:  has a past surgical history that includes Cholecystectomy; Tonsillectomy; Esophagogastroduodenoscopy w/ banding; ESOPHAGOGASTRODUODENOSCOPY (EGD) (N/A, 5/26/2017); and ESOPHAGOGASTRODUODENOSCOPY (EGD) (N/A, 11/10/2014).    Family History: family history is not on file.. Otherwise no colon cancer, inflammatory bowel disease, or GI malignancies.    Social History:  reports that  has never smoked. he has never used smokeless tobacco. He reports that he drinks alcohol. He reports that he does not use drugs.    Review of patient's allergies indicates:  No Known Allergies    Medications:   Medications Prior to Admission   Medication Sig Dispense Refill Last Dose    pantoprazole (PROTONIX) 40 MG tablet Take 40 mg by mouth Daily.   10/11/2018 at Unknown time    propranolol (INDERAL) 10 MG tablet Take 10 mg by mouth 3 (three) times daily.   10/11/2018 at Unknown time    venlafaxine (EFFEXOR-XR) 75 MG 24 hr capsule Take 75 mg by mouth once daily.   10/11/2018 at Unknown time    warfarin (COUMADIN) 5 MG tablet Take 1 tablet (5 mg total) by mouth Daily. (Patient taking differently: Take 5 mg  by mouth Daily. ) 30 tablet 3 10/7/2018       Physical Exam:    Vital Signs:   Vitals:    10/12/18 1312   BP: 139/86   Pulse: 65   Resp: 14   Temp: 97.9 °F (36.6 °C)       General Appearance: Well appearing in no acute distress  Eyes:    No scleral icterus  ENT: Neck supple, Lips, mucosa, and tongue normal; teeth and gums normal  Lungs: CTA anteriorly  Heart:  Regular rate, S1, S2 normal, no murmurs heard.  Abdomen: Soft, non tender, non distended with normal bowel sounds. No hepatosplenomegaly, ascites, or mass.  Extremities: No edema  Skin: No rash    Labs:  Lab Results   Component Value Date    WBC 5.19 10/12/2018    HGB 14.2 10/12/2018    HCT 43.6 10/12/2018     10/12/2018    ALT 24 05/28/2018    AST 18 05/28/2018     05/28/2018    K 3.9 05/28/2018     05/28/2018    CREATININE 0.8 05/28/2018    BUN 12 05/28/2018    CO2 25 05/28/2018    INR 1.2 10/12/2018       I have explained the risks and benefits of endoscopy procedures to the patient including but not limited to bleeding, perforation, infection, and death.      Richard Loving M.D.  Gastroenterology Fellow, PGY-V  Pager: [phone]  [facility]

## 2018-10-13 ENCOUNTER — PATIENT MESSAGE (OUTPATIENT)
Dept: GASTROENTEROLOGY | Facility: CLINIC | Age: 38
End: 2018-10-13

## 2018-10-15 ENCOUNTER — PATIENT MESSAGE (OUTPATIENT)
Dept: GASTROENTEROLOGY | Facility: CLINIC | Age: 38
End: 2018-10-15

## 2018-10-19 ENCOUNTER — TELEPHONE (OUTPATIENT)
Dept: ENDOSCOPY | Facility: HOSPITAL | Age: 38
End: 2018-10-19

## 2019-12-30 ENCOUNTER — TELEPHONE (OUTPATIENT)
Dept: HEPATOLOGY | Facility: CLINIC | Age: 39
End: 2019-12-30

## 2019-12-30 DIAGNOSIS — I81 PORTAL VEIN THROMBOSIS: ICD-10-CM

## 2019-12-30 DIAGNOSIS — K74.60 CIRRHOSIS OF LIVER WITHOUT ASCITES, UNSPECIFIED HEPATIC CIRRHOSIS TYPE: ICD-10-CM

## 2019-12-30 DIAGNOSIS — K76.89 OTHER SPECIFIED DISEASES OF LIVER: ICD-10-CM

## 2019-12-30 DIAGNOSIS — I85.00 PORTAL HYPERTENSION WITH ESOPHAGEAL VARICES: Primary | ICD-10-CM

## 2019-12-30 DIAGNOSIS — K76.6 PORTAL HYPERTENSION WITH ESOPHAGEAL VARICES: Primary | ICD-10-CM

## 2019-12-30 NOTE — TELEPHONE ENCOUNTER
MA tried contacting the pt on his home and cell number. I left a voicemail on his home phone to call me at my extension to reschedule

## 2019-12-30 NOTE — TELEPHONE ENCOUNTER
----- Message from Akhil Barnes sent at 12/30/2019  8:49 AM CST -----  Contact: pt  Patient calling to schedule appt       Pt#[phone]

## 2020-11-30 ENCOUNTER — TELEPHONE (OUTPATIENT)
Dept: GASTROENTEROLOGY | Facility: CLINIC | Age: 40
End: 2020-11-30

## 2020-11-30 ENCOUNTER — TELEPHONE (OUTPATIENT)
Dept: ENDOSCOPY | Facility: HOSPITAL | Age: 40
End: 2020-11-30

## 2020-11-30 DIAGNOSIS — K76.6 PORTAL HYPERTENSION WITH ESOPHAGEAL VARICES: Primary | ICD-10-CM

## 2020-11-30 DIAGNOSIS — I85.00 PORTAL HYPERTENSION WITH ESOPHAGEAL VARICES: Primary | ICD-10-CM

## 2020-11-30 NOTE — TELEPHONE ENCOUNTER
Spoke to Jojo corcoran and routed to Dr. Huntley   ----- Message from Ruth Ann sent at 11/30/2020 10:17 AM CST -----  Regarding: EGD Order  Contact: Jojo acuna [phone]  Jojo Acuna Patient Wife is calling to see if Order for EGD can be placed.  Patient is required to have it done every 2 years.  Patient is looking to have EGD done in January.

## 2020-12-15 ENCOUNTER — PATIENT MESSAGE (OUTPATIENT)
Dept: ENDOSCOPY | Facility: HOSPITAL | Age: 40
End: 2020-12-15

## 2020-12-15 DIAGNOSIS — Z12.11 SCREENING FOR COLON CANCER: Primary | ICD-10-CM

## 2020-12-18 ENCOUNTER — TELEPHONE (OUTPATIENT)
Dept: HEPATOLOGY | Facility: CLINIC | Age: 40
End: 2020-12-18

## 2020-12-18 NOTE — TELEPHONE ENCOUNTER
I spoke to the pt wife. She wants to schedule the pt for March, but we do not have schedules that far out. I told her we can schedule with an NP, but she declined and said she will call in Feb for March. I also put lesa  Recall for March 2021    ----- Message from Viraj Squires sent at 12/18/2020  3:03 PM CST -----          Calling on pt behalf to schedule an appt                     Jojo (wife)      [phone] (M)

## 2020-12-18 NOTE — TELEPHONE ENCOUNTER
----- Message from Kimberly Babcock sent at 12/18/2020 10:42 AM CST -----  Regarding: Schedule Appt  Contact: Patient Spouse Jojo  Spouse is requesting a call back to schedule a follow up appt for patient with physician   Spouse would like patient to be seen as soon as possible     Patient Spouse Jojo can be reached at [phone]

## 2020-12-23 ENCOUNTER — TELEPHONE (OUTPATIENT)
Dept: HEPATOLOGY | Facility: CLINIC | Age: 40
End: 2020-12-23

## 2020-12-23 ENCOUNTER — PATIENT MESSAGE (OUTPATIENT)
Dept: HEPATOLOGY | Facility: CLINIC | Age: 40
End: 2020-12-23

## 2020-12-28 DIAGNOSIS — K74.60 CIRRHOSIS OF LIVER WITHOUT ASCITES, UNSPECIFIED HEPATIC CIRRHOSIS TYPE: Primary | ICD-10-CM

## 2020-12-28 DIAGNOSIS — K76.6 PORTAL HYPERTENSION: ICD-10-CM

## 2021-01-11 ENCOUNTER — ANESTHESIA EVENT (OUTPATIENT)
Dept: ENDOSCOPY | Facility: HOSPITAL | Age: 41
End: 2021-01-11
Payer: COMMERCIAL

## 2021-01-11 ENCOUNTER — ANESTHESIA (OUTPATIENT)
Dept: ENDOSCOPY | Facility: HOSPITAL | Age: 41
End: 2021-01-11
Payer: COMMERCIAL

## 2021-01-11 ENCOUNTER — HOSPITAL ENCOUNTER (OUTPATIENT)
Facility: HOSPITAL | Age: 41
Discharge: HOME | End: 2021-01-11
Attending: INTERNAL MEDICINE | Admitting: INTERNAL MEDICINE
Payer: COMMERCIAL

## 2021-01-11 VITALS
BODY MASS INDEX: 34.53 KG/M2 | WEIGHT: 220 LBS | SYSTOLIC BLOOD PRESSURE: 125 MMHG | DIASTOLIC BLOOD PRESSURE: 76 MMHG | HEART RATE: 57 BPM | TEMPERATURE: 99 F | OXYGEN SATURATION: 97 % | RESPIRATION RATE: 14 BRPM | HEIGHT: 67 IN

## 2021-01-11 DIAGNOSIS — I85.10 SECONDARY ESOPHAGEAL VARICES WITHOUT BLEEDING: Primary | ICD-10-CM

## 2021-01-11 DIAGNOSIS — I85.00 ESOPHAGEAL VARICES: ICD-10-CM

## 2021-01-11 PROCEDURE — 43235 EGD DIAGNOSTIC BRUSH WASH: CPT | Performed by: INTERNAL MEDICINE

## 2021-01-11 PROCEDURE — E9220 PRA ENDO ANESTHESIA: ICD-10-PCS | Mod: ,,, | Performed by: NURSE ANESTHETIST, CERTIFIED REGISTERED

## 2021-01-11 PROCEDURE — 43235 EGD DIAGNOSTIC BRUSH WASH: CPT | Mod: ,,, | Performed by: INTERNAL MEDICINE

## 2021-01-11 PROCEDURE — 25000003 PHARM REV CODE 250: Performed by: INTERNAL MEDICINE

## 2021-01-11 PROCEDURE — 63600175 PHARM REV CODE 636 W HCPCS: Performed by: NURSE ANESTHETIST, CERTIFIED REGISTERED

## 2021-01-11 PROCEDURE — E9220 PRA ENDO ANESTHESIA: HCPCS | Mod: ,,, | Performed by: NURSE ANESTHETIST, CERTIFIED REGISTERED

## 2021-01-11 PROCEDURE — 43235 PR EGD, FLEX, DIAGNOSTIC: ICD-10-PCS | Mod: ,,, | Performed by: INTERNAL MEDICINE

## 2021-01-11 PROCEDURE — 37000008 HC ANESTHESIA 1ST 15 MINUTES: Performed by: INTERNAL MEDICINE

## 2021-01-11 RX ORDER — PROPOFOL 10 MG/ML
VIAL (ML) INTRAVENOUS
Status: DISCONTINUED | OUTPATIENT
Start: 2021-01-11 | End: 2021-01-11

## 2021-01-11 RX ORDER — ENOXAPARIN SODIUM 150 MG/ML
150 INJECTION SUBCUTANEOUS
COMMUNITY

## 2021-01-11 RX ORDER — SODIUM CHLORIDE 9 MG/ML
INJECTION, SOLUTION INTRAVENOUS CONTINUOUS
Status: DISCONTINUED | OUTPATIENT
Start: 2021-01-11 | End: 2021-01-11 | Stop reason: HOSPADM

## 2021-01-11 RX ADMIN — PROPOFOL 20 MG: 10 INJECTION, EMULSION INTRAVENOUS at 01:01

## 2021-01-11 RX ADMIN — PROPOFOL 40 MG: 10 INJECTION, EMULSION INTRAVENOUS at 01:01

## 2021-01-11 RX ADMIN — SODIUM CHLORIDE: 0.9 INJECTION, SOLUTION INTRAVENOUS at 01:01

## 2021-01-11 RX ADMIN — PROPOFOL 50 MG: 10 INJECTION, EMULSION INTRAVENOUS at 01:01

## 2021-01-11 RX ADMIN — SODIUM CHLORIDE 10 ML/HR: 0.9 INJECTION, SOLUTION INTRAVENOUS at 01:01

## 2021-01-12 ENCOUNTER — HOSPITAL ENCOUNTER (OUTPATIENT)
Dept: RADIOLOGY | Facility: HOSPITAL | Age: 41
Discharge: HOME | End: 2021-01-12
Attending: INTERNAL MEDICINE
Payer: COMMERCIAL

## 2021-01-12 DIAGNOSIS — K76.6 PORTAL HYPERTENSION: ICD-10-CM

## 2021-01-12 DIAGNOSIS — K74.60 CIRRHOSIS OF LIVER WITHOUT ASCITES, UNSPECIFIED HEPATIC CIRRHOSIS TYPE: ICD-10-CM

## 2021-01-12 PROCEDURE — 74170 CT ABDOMEN W WO CONTRAST: ICD-10-PCS | Mod: 26,,, | Performed by: RADIOLOGY

## 2021-01-12 PROCEDURE — 74170 CT ABD WO CNTRST FLWD CNTRST: CPT | Mod: 26,,, | Performed by: RADIOLOGY

## 2021-01-12 PROCEDURE — 74170 CT ABD WO CNTRST FLWD CNTRST: CPT | Mod: TC

## 2021-01-12 PROCEDURE — 25500020 PHARM REV CODE 255: Performed by: INTERNAL MEDICINE

## 2021-01-12 RX ADMIN — IOHEXOL 100 ML: 350 INJECTION, SOLUTION INTRAVENOUS at 10:01

## 2021-01-20 ENCOUNTER — OFFICE VISIT (OUTPATIENT)
Dept: HEPATOLOGY | Facility: CLINIC | Age: 41
End: 2021-01-20
Payer: COMMERCIAL

## 2021-01-20 DIAGNOSIS — I81 PORTAL VEIN THROMBOSIS: Primary | ICD-10-CM

## 2021-01-20 DIAGNOSIS — I85.00 PORTAL HYPERTENSION WITH ESOPHAGEAL VARICES: ICD-10-CM

## 2021-01-20 DIAGNOSIS — K76.6 PORTAL HYPERTENSION WITH ESOPHAGEAL VARICES: ICD-10-CM

## 2021-01-20 PROCEDURE — 99213 OFFICE O/P EST LOW 20 MIN: CPT | Mod: 95,,, | Performed by: INTERNAL MEDICINE

## 2021-01-20 PROCEDURE — 99213 PR OFFICE/OUTPT VISIT, EST, LEVL III, 20-29 MIN: ICD-10-PCS | Mod: 95,,, | Performed by: INTERNAL MEDICINE

## 2021-05-12 ENCOUNTER — PATIENT MESSAGE (OUTPATIENT)
Dept: RESEARCH | Facility: HOSPITAL | Age: 41
End: 2021-05-12

## 2022-03-06 ENCOUNTER — PATIENT MESSAGE (OUTPATIENT)
Dept: GASTROENTEROLOGY | Facility: CLINIC | Age: 42
End: 2022-03-06
Payer: COMMERCIAL

## 2022-03-06 DIAGNOSIS — Z80.0 FAMILY HISTORY OF COLON CANCER: ICD-10-CM

## 2022-03-06 DIAGNOSIS — I85.00 PORTAL HYPERTENSION WITH ESOPHAGEAL VARICES: Primary | ICD-10-CM

## 2022-03-06 DIAGNOSIS — K76.6 PORTAL HYPERTENSION WITH ESOPHAGEAL VARICES: Primary | ICD-10-CM

## 2022-03-08 ENCOUNTER — PATIENT MESSAGE (OUTPATIENT)
Dept: GASTROENTEROLOGY | Facility: CLINIC | Age: 42
End: 2022-03-08
Payer: COMMERCIAL

## 2022-03-09 ENCOUNTER — TELEPHONE (OUTPATIENT)
Dept: ENDOSCOPY | Facility: HOSPITAL | Age: 42
End: 2022-03-09
Payer: COMMERCIAL

## 2022-03-09 NOTE — TELEPHONE ENCOUNTER
Coumadin hold requested from Dr. Quita Walker at [facility]. Phone # [phone]; fax # [phone]

## 2022-03-28 ENCOUNTER — TELEPHONE (OUTPATIENT)
Dept: ENDOSCOPY | Facility: HOSPITAL | Age: 42
End: 2022-03-28
Payer: COMMERCIAL

## 2022-03-28 NOTE — TELEPHONE ENCOUNTER
DINAHM for Dr. Quita Walker nurse at [facility] to check status on coumadin hold request.

## 2022-03-28 NOTE — TELEPHONE ENCOUNTER
Coumadin hold request faxed again to Dr. Quita Walker at [facility] Phone # [phone]; fax # [phone].

## 2022-03-30 ENCOUNTER — TELEPHONE (OUTPATIENT)
Dept: ENDOSCOPY | Facility: HOSPITAL | Age: 42
End: 2022-03-30
Payer: COMMERCIAL

## 2022-03-30 NOTE — TELEPHONE ENCOUNTER
Received coumadin hold approval from Dr. Walker at [facility]. Called patient to schedule procedure. Patient states he would like to schedule procedure for June. Patient informed that the June schedule is not yet available. Patient will call back in a week of two to see if June schedule is available.

## 2022-04-19 DIAGNOSIS — Z12.11 SCREEN FOR COLON CANCER: ICD-10-CM

## 2022-04-19 DIAGNOSIS — Z87.19 HISTORY OF ESOPHAGEAL VARICES: Primary | ICD-10-CM

## 2022-04-19 RX ORDER — SODIUM, POTASSIUM,MAG SULFATES 17.5-3.13G
1 SOLUTION, RECONSTITUTED, ORAL ORAL DAILY
Qty: 1 KIT | Refills: 0 | Status: SHIPPED | OUTPATIENT
Start: 2022-04-19 | End: 2022-04-21

## 2022-06-20 ENCOUNTER — TELEPHONE (OUTPATIENT)
Dept: GASTROENTEROLOGY | Facility: CLINIC | Age: 42
End: 2022-06-20
Payer: COMMERCIAL

## 2022-06-20 NOTE — TELEPHONE ENCOUNTER
Gave pt wife the number to schedulers [phone]. Pt wife thank me     ----- Message from Monika Chavez sent at 6/20/2022  9:54 AM CDT -----  Type: Patient Call Back    Who called:Self    What is the request in detail: Pt has an colonoscopy schedule. Pt states pharmacy said they never received an prescription for his colonoscopy prep.    Can the clinic reply by [facility]? no    Would the patient rather a call back or a response via [facility]? Call back    Best call back number [phone] (home) [phone] (work)

## 2022-06-21 DIAGNOSIS — Z12.11 SPECIAL SCREENING FOR MALIGNANT NEOPLASMS, COLON: Primary | ICD-10-CM

## 2022-06-21 RX ORDER — SODIUM, POTASSIUM,MAG SULFATES 17.5-3.13G
1 SOLUTION, RECONSTITUTED, ORAL ORAL DAILY
Qty: 1 KIT | Refills: 0 | Status: ON HOLD | OUTPATIENT
Start: 2022-06-21 | End: 2022-06-24 | Stop reason: HOSPADM

## 2022-06-24 ENCOUNTER — ANESTHESIA (OUTPATIENT)
Dept: ENDOSCOPY | Facility: HOSPITAL | Age: 42
End: 2022-06-24
Payer: COMMERCIAL

## 2022-06-24 ENCOUNTER — HOSPITAL ENCOUNTER (OUTPATIENT)
Facility: HOSPITAL | Age: 42
Discharge: HOME | End: 2022-06-24
Attending: INTERNAL MEDICINE | Admitting: INTERNAL MEDICINE
Payer: COMMERCIAL

## 2022-06-24 ENCOUNTER — LAB VISIT (OUTPATIENT)
Dept: LAB | Facility: HOSPITAL | Age: 42
End: 2022-06-24
Attending: INTERNAL MEDICINE
Payer: COMMERCIAL

## 2022-06-24 ENCOUNTER — ANESTHESIA EVENT (OUTPATIENT)
Dept: ENDOSCOPY | Facility: HOSPITAL | Age: 42
End: 2022-06-24
Payer: COMMERCIAL

## 2022-06-24 VITALS
WEIGHT: 220 LBS | OXYGEN SATURATION: 97 % | HEIGHT: 65 IN | HEART RATE: 59 BPM | DIASTOLIC BLOOD PRESSURE: 86 MMHG | RESPIRATION RATE: 17 BRPM | TEMPERATURE: 98 F | BODY MASS INDEX: 36.65 KG/M2 | SYSTOLIC BLOOD PRESSURE: 114 MMHG

## 2022-06-24 DIAGNOSIS — Z87.19 HISTORY OF ESOPHAGEAL VARICES: ICD-10-CM

## 2022-06-24 DIAGNOSIS — Z80.0 FAMILY HISTORY OF COLON CANCER: ICD-10-CM

## 2022-06-24 DIAGNOSIS — I85.10 SECONDARY ESOPHAGEAL VARICES WITHOUT BLEEDING: Primary | ICD-10-CM

## 2022-06-24 LAB
BASOPHILS # BLD AUTO: 0.06 K/UL (ref 0–0.2)
BASOPHILS NFR BLD: 0.9 % (ref 0–1.9)
DIFFERENTIAL METHOD: ABNORMAL
EOSINOPHIL # BLD AUTO: 0.1 K/UL (ref 0–0.5)
EOSINOPHIL NFR BLD: 1.8 % (ref 0–8)
ERYTHROCYTE [DISTWIDTH] IN BLOOD BY AUTOMATED COUNT: 15.3 % (ref 11.5–14.5)
HCT VFR BLD AUTO: 49.5 % (ref 40–54)
HGB BLD-MCNC: 17.3 G/DL (ref 14–18)
IMM GRANULOCYTES # BLD AUTO: 0.02 K/UL (ref 0–0.04)
IMM GRANULOCYTES NFR BLD AUTO: 0.3 % (ref 0–0.5)
INR PPP: 1.2 (ref 0.8–1.2)
LYMPHOCYTES # BLD AUTO: 1.8 K/UL (ref 1–4.8)
LYMPHOCYTES NFR BLD: 26.2 % (ref 18–48)
MCH RBC QN AUTO: 29.9 PG (ref 27–31)
MCHC RBC AUTO-ENTMCNC: 34.9 G/DL (ref 32–36)
MCV RBC AUTO: 86 FL (ref 82–98)
MONOCYTES # BLD AUTO: 0.5 K/UL (ref 0.3–1)
MONOCYTES NFR BLD: 7.9 % (ref 4–15)
NEUTROPHILS # BLD AUTO: 4.3 K/UL (ref 1.8–7.7)
NEUTROPHILS NFR BLD: 62.9 % (ref 38–73)
NRBC BLD-RTO: 0 /100 WBC
PLATELET # BLD AUTO: 266 K/UL (ref 150–450)
PMV BLD AUTO: 9.3 FL (ref 9.2–12.9)
PROTHROMBIN TIME: 12.3 SEC (ref 9–12.5)
RBC # BLD AUTO: 5.79 M/UL (ref 4.6–6.2)
WBC # BLD AUTO: 6.8 K/UL (ref 3.9–12.7)

## 2022-06-24 PROCEDURE — 37000009 HC ANESTHESIA EA ADD 15 MINS: Performed by: INTERNAL MEDICINE

## 2022-06-24 PROCEDURE — 43235 EGD DIAGNOSTIC BRUSH WASH: CPT | Mod: 51,,, | Performed by: INTERNAL MEDICINE

## 2022-06-24 PROCEDURE — G0105 COLORECTAL SCRN; HI RISK IND: HCPCS | Mod: ,,, | Performed by: INTERNAL MEDICINE

## 2022-06-24 PROCEDURE — 43235 EGD DIAGNOSTIC BRUSH WASH: CPT | Performed by: INTERNAL MEDICINE

## 2022-06-24 PROCEDURE — 85025 COMPLETE CBC W/AUTO DIFF WBC: CPT | Performed by: INTERNAL MEDICINE

## 2022-06-24 PROCEDURE — 25000003 PHARM REV CODE 250: Performed by: INTERNAL MEDICINE

## 2022-06-24 PROCEDURE — 37000008 HC ANESTHESIA 1ST 15 MINUTES: Performed by: INTERNAL MEDICINE

## 2022-06-24 PROCEDURE — 63600175 PHARM REV CODE 636 W HCPCS: Performed by: NURSE ANESTHETIST, CERTIFIED REGISTERED

## 2022-06-24 PROCEDURE — 43235 PR EGD, FLEX, DIAGNOSTIC: ICD-10-PCS | Mod: 51,,, | Performed by: INTERNAL MEDICINE

## 2022-06-24 PROCEDURE — G0105 COLORECTAL SCRN; HI RISK IND: HCPCS | Performed by: INTERNAL MEDICINE

## 2022-06-24 PROCEDURE — 85610 PROTHROMBIN TIME: CPT | Performed by: INTERNAL MEDICINE

## 2022-06-24 PROCEDURE — G0105 COLORECTAL SCRN; HI RISK IND: ICD-10-PCS | Mod: ,,, | Performed by: INTERNAL MEDICINE

## 2022-06-24 PROCEDURE — 36415 COLL VENOUS BLD VENIPUNCTURE: CPT | Performed by: INTERNAL MEDICINE

## 2022-06-24 RX ORDER — PROPOFOL 10 MG/ML
VIAL (ML) INTRAVENOUS
Status: DISCONTINUED | OUTPATIENT
Start: 2022-06-24 | End: 2022-06-24

## 2022-06-24 RX ORDER — LIDOCAINE HYDROCHLORIDE 20 MG/ML
INJECTION, SOLUTION EPIDURAL; INFILTRATION; INTRACAUDAL; PERINEURAL
Status: DISCONTINUED | OUTPATIENT
Start: 2022-06-24 | End: 2022-06-24

## 2022-06-24 RX ORDER — SODIUM CHLORIDE 9 MG/ML
INJECTION, SOLUTION INTRAVENOUS CONTINUOUS
Status: DISCONTINUED | OUTPATIENT
Start: 2022-06-24 | End: 2022-06-24 | Stop reason: HOSPADM

## 2022-06-24 RX ORDER — PROPOFOL 10 MG/ML
VIAL (ML) INTRAVENOUS CONTINUOUS PRN
Status: DISCONTINUED | OUTPATIENT
Start: 2022-06-24 | End: 2022-06-24

## 2022-06-24 RX ORDER — ESCITALOPRAM OXALATE 20 MG/1
20 TABLET ORAL DAILY
COMMUNITY

## 2022-06-24 RX ADMIN — Medication 175 MCG/KG/MIN: at 12:06

## 2022-06-24 RX ADMIN — LIDOCAINE HYDROCHLORIDE 50 MG: 20 INJECTION, SOLUTION EPIDURAL; INFILTRATION; INTRACAUDAL; PERINEURAL at 12:06

## 2022-06-24 RX ADMIN — SODIUM CHLORIDE: 0.9 INJECTION, SOLUTION INTRAVENOUS at 12:06

## 2022-06-24 RX ADMIN — PROPOFOL 100 MG: 10 INJECTION, EMULSION INTRAVENOUS at 12:06

## 2022-06-24 NOTE — PROVATION PATIENT INSTRUCTIONS
Discharge Summary/Instructions after an Endoscopic Procedure  Patient Name: Parker Peterson  Patient MRN: [id number]  Patient YOB: 1980 Friday, June 24, 2022  Frank Mares MD  Dear patient,  As a result of recent federal legislation (The Federal Cures Act), you may   receive lab or pathology results from your procedure in your [facility]   account before your physician is able to contact you. Your physician or   their representative will relay the results to you with their   recommendations at their soonest availability.  Thank you,  RESTRICTIONS:  During your procedure today, you received medications for sedation.  These   medications may affect your judgment, balance and coordination.  Therefore,   for 24 hours, you have the following restrictions:   - DO NOT drive a car, operate machinery, make legal/financial decisions,   sign important papers or drink alcohol.    ACTIVITY:  Today: no heavy lifting, straining or running due to procedural   sedation/anesthesia.  The following day: return to full activity including work.  DIET:  Eat and drink normally unless instructed otherwise.     TREATMENT FOR COMMON SIDE EFFECTS:  - Mild abdominal pain, nausea, belching, bloating or excessive gas:  rest,   eat lightly and use a heating pad.  - Sore Throat: treat with throat lozenges and/or gargle with warm salt   water.  - Because air was used during the procedure, expelling large amounts of air   from your rectum or belching is normal.  - If a bowel prep was taken, you may not have a bowel movement for 1-3 days.    This is normal.  SYMPTOMS TO WATCH FOR AND REPORT TO YOUR PHYSICIAN:  1. Abdominal pain or bloating, other than gas cramps.  2. Chest pain.  3. Back pain.  4. Signs of infection such as: chills or fever occurring within 24 hours   after the procedure.  5. Rectal bleeding, which would show as bright red, maroon, or black stools.   (A tablespoon of blood from the rectum is not serious, especially if    hemorrhoids are present.)  6. Vomiting.  7. Weakness or dizziness.  GO DIRECTLY TO THE NEAREST EMERGENCY ROOM IF YOU HAVE ANY OF THE FOLLOWING:      Difficulty breathing              Chills and/or fever over 101 F   Persistent vomiting and/or vomiting blood   Severe abdominal pain   Severe chest pain   Black, tarry stools   Bleeding- more than one tablespoon   Any other symptom or condition that you feel may need urgent attention  Your doctor recommends these additional instructions:  If any biopsies were taken, your doctors clinic will contact you in 1 to 2   weeks with any results.  - Discharge patient to home.   - Patient has a contact number available for emergencies.  The signs and   symptoms of potential delayed complications were discussed with the   patient.  Return to normal activities tomorrow.  Written discharge   instructions were provided to the patient.   - Resume previous diet.   - Continue present medications.   - Resume Coumadin (warfarin) today and Lovenox (enoxaparin) today at prior   doses.   - Repeat colonoscopy in 10 years for screening purposes.  For questions, problems or results please call your physician - Frank Mares MD at Work:  [phone].  [facility], EMERGENCY ROOM PHONE NUMBER: [phone]  IF A COMPLICATION OR EMERGENCY SITUATION ARISES AND YOU ARE UNABLE TO REACH   YOUR PHYSICIAN - GO DIRECTLY TO THE EMERGENCY ROOM.  Frank Mares MD  6/24/2022 1:12:54 PM  This report has been verified and signed electronically.  Dear patient,  As a result of recent federal legislation (The Federal Cures Act), you may   receive lab or pathology results from your procedure in your [facility]   account before your physician is able to contact you. Your physician or   their representative will relay the results to you with their   recommendations at their soonest availability.  Thank you,  PROVATION

## 2022-06-24 NOTE — TRANSFER OF CARE
"Anesthesia Transfer of Care Note    Patient: Parker Peterson    Procedure(s) Performed: Procedure(s) (LRB):  ESOPHAGOGASTRODUODENOSCOPY (EGD) (N/A)  COLONOSCOPY (N/A)    Patient location: PACU    Anesthesia Type: general    Transport from OR: Transported from OR on room air with adequate spontaneous ventilation    Post pain: adequate analgesia    Post assessment: no apparent anesthetic complications    Post vital signs: stable    Level of consciousness: awake    Nausea/Vomiting: no nausea/vomiting    Complications: none    Transfer of care protocol was followed      Last vitals:   Visit Vitals  BP (!) 142/86   Pulse 72   Temp 36.5 °C (97.7 °F) (Temporal)   Resp 20   Ht 5' 5" (1.651 m)   Wt 99.8 kg (220 lb)   SpO2 96%   BMI 36.61 kg/m²     "

## 2022-06-24 NOTE — PLAN OF CARE
Plan of care reviewed with patient, discharge criteria met and instructions given. Verbalized understanding.

## 2022-06-24 NOTE — PROVATION PATIENT INSTRUCTIONS
Discharge Summary/Instructions after an Endoscopic Procedure  Patient Name: Parker Peterson  Patient MRN: [id number]  Patient YOB: 1980 Friday, June 24, 2022  Frank Mares MD  Dear patient,  As a result of recent federal legislation (The Federal Cures Act), you may   receive lab or pathology results from your procedure in your [facility]   account before your physician is able to contact you. Your physician or   their representative will relay the results to you with their   recommendations at their soonest availability.  Thank you,  RESTRICTIONS:  During your procedure today, you received medications for sedation.  These   medications may affect your judgment, balance and coordination.  Therefore,   for 24 hours, you have the following restrictions:   - DO NOT drive a car, operate machinery, make legal/financial decisions,   sign important papers or drink alcohol.    ACTIVITY:  Today: no heavy lifting, straining or running due to procedural   sedation/anesthesia.  The following day: return to full activity including work.  DIET:  Eat and drink normally unless instructed otherwise.     TREATMENT FOR COMMON SIDE EFFECTS:  - Mild abdominal pain, nausea, belching, bloating or excessive gas:  rest,   eat lightly and use a heating pad.  - Sore Throat: treat with throat lozenges and/or gargle with warm salt   water.  - Because air was used during the procedure, expelling large amounts of air   from your rectum or belching is normal.  - If a bowel prep was taken, you may not have a bowel movement for 1-3 days.    This is normal.  SYMPTOMS TO WATCH FOR AND REPORT TO YOUR PHYSICIAN:  1. Abdominal pain or bloating, other than gas cramps.  2. Chest pain.  3. Back pain.  4. Signs of infection such as: chills or fever occurring within 24 hours   after the procedure.  5. Rectal bleeding, which would show as bright red, maroon, or black stools.   (A tablespoon of blood from the rectum is not serious, especially if    hemorrhoids are present.)  6. Vomiting.  7. Weakness or dizziness.  GO DIRECTLY TO THE NEAREST EMERGENCY ROOM IF YOU HAVE ANY OF THE FOLLOWING:      Difficulty breathing              Chills and/or fever over 101 F   Persistent vomiting and/or vomiting blood   Severe abdominal pain   Severe chest pain   Black, tarry stools   Bleeding- more than one tablespoon   Any other symptom or condition that you feel may need urgent attention  Your doctor recommends these additional instructions:  If any biopsies were taken, your doctors clinic will contact you in 1 to 2   weeks with any results.  - Perform a colonoscopy now.   - See colonoscopy report for further recommendations.  For questions, problems or results please call your physician - Frank Mares MD at Work:  [phone].  [facility], EMERGENCY ROOM PHONE NUMBER: [phone]  IF A COMPLICATION OR EMERGENCY SITUATION ARISES AND YOU ARE UNABLE TO REACH   YOUR PHYSICIAN - GO DIRECTLY TO THE EMERGENCY ROOM.  Frank Mares MD  6/24/2022 12:50:33 PM  This report has been verified and signed electronically.  Dear patient,  As a result of recent federal legislation (The Federal Cures Act), you may   receive lab or pathology results from your procedure in your [facility]   account before your physician is able to contact you. Your physician or   their representative will relay the results to you with their   recommendations at their soonest availability.  Thank you,  PROVATION

## 2022-06-24 NOTE — H&P
Short Stay Endoscopy History and Physical    PCP - Primary Doctor No    Procedure - EGD/Colonoscopy  ASA - per anesthesia  Mallampati - per anesthesia  History of Anesthesia problems - no  Family history Anesthesia problems -  no   Plan of anesthesia - MAC    HPI:  This is a 41 y.o. male here for evaluation of history of esophageal varices and needs colon cancer screening due to family history of colon cancer.       ROS:  Constitutional: No fevers, chills  CV: No chest pain  Pulm: No cough, No shortness of breath  Ophtho: No vision changes  GI: see HPI    Medical History:  has a past medical history of Anemia, Esophageal varices, Mesenteric vein thrombosis, Portal hypertension with esophageal varices, and Pulmonary embolism.    Surgical History:  has a past surgical history that includes Cholecystectomy; Tonsillectomy; Esophagogastroduodenoscopy w/ banding; Esophagogastroduodenoscopy (N/A, 10/12/2018); and Esophagogastroduodenoscopy (N/A, 2021).    Family History: family history is not on file.. Otherwise no colon cancer, inflammatory bowel disease, or GI malignancies.    Social History:  reports that he has never smoked. He has never used smokeless tobacco. He reports current alcohol use. He reports that he does not use drugs.    Review of patient's allergies indicates:  No Known Allergies    Medications:   Medications Prior to Admission   Medication Sig Dispense Refill Last Dose    enoxaparin (LOVENOX) 150 mg/mL Syrg Inject 150 mg into the skin every 12 (twelve) hours.   2022 at Unknown time    EScitalopram oxalate (LEXAPRO) 20 MG tablet Take 20 mg by mouth once daily.   2022 at Unknown time    pantoprazole (PROTONIX) 40 MG tablet Take 40 mg by mouth Daily.   2022 at Unknown time    propranolol (INDERAL) 10 MG tablet Take 10 mg by mouth 3 (three) times daily.   2022 at Unknown time    [] sodium,potassium,mag sulfates (SUPREP BOWEL PREP KIT) 17.5-3.13-1.6 gram SolR Take 177 mLs  by mouth once daily. for 2 days 1 kit 0     venlafaxine (EFFEXOR-XR) 75 MG 24 hr capsule Take 75 mg by mouth once daily.       warfarin (COUMADIN) 5 MG tablet Take 1 tablet (5 mg total) by mouth Daily. (Patient taking differently: Take 5 mg by mouth Daily.) 30 tablet 3 06/16/2022       Physical Exam:    Vital Signs:   Vitals:    06/24/22 1200   BP: (!) 142/86   Pulse:    Resp:    Temp:        General Appearance: Well appearing in no acute distress  Eyes:    No scleral icterus  Lungs: CTA anteriorly  Heart:  Regular rate and rhythm  Abdomen: Soft, non tender, non distended with normal bowel sounds.    Labs:  Lab Results   Component Value Date    WBC 6.80 06/24/2022    HGB 17.3 06/24/2022    HCT 49.5 06/24/2022    MCV 86 06/24/2022     06/24/2022        BMP  Lab Results   Component Value Date     01/11/2021    K 3.8 01/11/2021     01/11/2021    CO2 24 01/11/2021    BUN 15 01/11/2021    CREATININE 0.8 01/11/2021    CALCIUM 9.8 01/11/2021    ANIONGAP 12 01/11/2021    ESTGFRAFRICA >60.0 01/11/2021    EGFRNONAA >60.0 01/11/2021     Lab Results   Component Value Date    INR 1.2 06/24/2022    INR 1.1 01/11/2021    INR 1.2 10/12/2018          Assessment:  41 y.o. male with history of esophageal varices and family history of colon cancer.     Plan:  Proceed with EGD and colonoscopy today.  I have explained the risks and benefits of endoscopy procedures to the patient including but not limited to bleeding, perforation, infection, and death.  All questions answered.        Frank Mares MD

## 2022-06-24 NOTE — ANESTHESIA PREPROCEDURE EVALUATION
06/24/2022  Parker Peterson is a 41 y.o., male.  Past Medical History:   Diagnosis Date    Anemia     Esophageal varices     Mesenteric vein thrombosis     Portal hypertension with esophageal varices     Pulmonary embolism      Past Surgical History:   Procedure Laterality Date    CHOLECYSTECTOMY      Laparoscopic    ESOPHAGOGASTRODUODENOSCOPY N/A 10/12/2018    Procedure: ESOPHAGOGASTRODUODENOSCOPY (EGD);  Surgeon: Cortez Huntley MD;  Location: [facility] ([address] [place] [address]);  Service: Endoscopy;  Laterality: N/A;  Ok to hold Coumadin 5 days prior per Dr. Campos  Patient will bridge with lovenox per Dr. Walker's instructions.  Labs ordered prior for Hx of varices    ESOPHAGOGASTRODUODENOSCOPY N/A 1/11/2021    Procedure: ESOPHAGOGASTRODUODENOSCOPY (EGD);  Surgeon: Cortez Huntley MD;  Location: [address]);  Service: Endoscopy;  Laterality: N/A;  Repeat upper endoscopy in 2 years for surveillance.   Can hold Coumadin for 5 days with Lovenox bridging instructed by PCP Dr. Quita Walker see media tab - sm  Labs am of procedure  12/15 -Pt informed to bring official paper documentation of COVID results     ESOPHAGOGASTRODUODENOSCOPY W/ BANDING      TONSILLECTOMY       ,prob      Pre-op Assessment    I have reviewed the Patient Summary Reports.     I have reviewed the Nursing Notes. I have reviewed the NPO Status.   I have reviewed the Medications.     Review of Systems  Anesthesia Hx:  No problems with previous Anesthesia    Social:  Non-Smoker, Social Alcohol Use        Physical Exam  General: Well nourished, Alert and Oriented    Airway:  Mallampati: IV   Mouth Opening: Normal  TM Distance: Normal  Tongue: Normal  Neck ROM: Normal ROM    Dental:  Intact        Anesthesia Plan  Type of Anesthesia, risks & benefits discussed:    Anesthesia Type: Gen Natural Airway  Intra-op Monitoring Plan: Standard ASA  Monitors  Induction:  IV  Airway Plan: , Awake  Informed Consent: Informed consent signed with the Patient and all parties understand the risks and agree with anesthesia plan.  All questions answered.   ASA Score: 2    Ready For Surgery From Anesthesia Perspective.     .

## 2022-06-27 NOTE — ANESTHESIA POSTPROCEDURE EVALUATION
Anesthesia Post Evaluation    Patient: Parker Peterson    Procedure(s) Performed: Procedure(s) (LRB):  ESOPHAGOGASTRODUODENOSCOPY (EGD) (N/A)  COLONOSCOPY (N/A)    Final Anesthesia Type: general      Patient location during evaluation: GI PACU  Patient participation: Yes- Able to Participate  Level of consciousness: awake and alert and oriented  Post-procedure vital signs: reviewed and stable  Pain management: adequate  Airway patency: patent    PONV status at discharge: No PONV  Anesthetic complications: no      Cardiovascular status: hemodynamically stable  Respiratory status: unassisted, spontaneous ventilation and room air  Hydration status: euvolemic  Follow-up not needed.          Vitals Value Taken Time   /86 06/24/22 1350   Temp 36.6 °C (97.9 °F) 06/24/22 1320   Pulse 59 06/24/22 1350   Resp 17 06/24/22 1350   SpO2 97 % 06/24/22 1350         Event Time   Out of Recovery 13:53:09         Pain/Kori Score: No data recorded

## 2022-07-28 NOTE — TELEPHONE ENCOUNTER
Have You Had Fillers Before?: has not had fillers Patient called to schedule his EGD. Patient is on Coumadin and it is managed by his PCP Dr. Quita Walker at [facility].    Faxed over Coumadin holding approval to [phone].   Additional History: Present today for fillers in checks no Botox today, patient applied numbing cream 1 hour ago and again 20 mins ago